# Patient Record
Sex: FEMALE | Race: BLACK OR AFRICAN AMERICAN | NOT HISPANIC OR LATINO | Employment: FULL TIME | URBAN - METROPOLITAN AREA
[De-identification: names, ages, dates, MRNs, and addresses within clinical notes are randomized per-mention and may not be internally consistent; named-entity substitution may affect disease eponyms.]

---

## 2021-03-09 ENCOUNTER — HOSPITAL ENCOUNTER (EMERGENCY)
Facility: HOSPITAL | Age: 46
Discharge: HOME | End: 2021-03-09
Attending: EMERGENCY MEDICINE
Payer: COMMERCIAL

## 2021-03-09 VITALS
WEIGHT: 178 LBS | RESPIRATION RATE: 16 BRPM | OXYGEN SATURATION: 97 % | SYSTOLIC BLOOD PRESSURE: 122 MMHG | TEMPERATURE: 99 F | HEART RATE: 76 BPM | DIASTOLIC BLOOD PRESSURE: 85 MMHG | BODY MASS INDEX: 30.39 KG/M2 | HEIGHT: 64 IN

## 2021-03-09 DIAGNOSIS — K13.79 ORAL PAIN: Primary | ICD-10-CM

## 2021-03-09 PROCEDURE — 99284 EMERGENCY DEPT VISIT MOD MDM: CPT

## 2021-03-09 PROCEDURE — 86803 HEPATITIS C AB TEST: CPT | Performed by: EMERGENCY MEDICINE

## 2021-03-09 PROCEDURE — 99284 EMERGENCY DEPT VISIT MOD MDM: CPT | Mod: ,,, | Performed by: NURSE PRACTITIONER

## 2021-03-09 PROCEDURE — 99284 PR EMERGENCY DEPT VISIT,LEVEL IV: ICD-10-PCS | Mod: ,,, | Performed by: NURSE PRACTITIONER

## 2021-03-09 PROCEDURE — 86703 HIV-1/HIV-2 1 RESULT ANTBDY: CPT | Performed by: EMERGENCY MEDICINE

## 2021-03-09 RX ORDER — PENICILLIN V POTASSIUM 500 MG/1
500 TABLET, FILM COATED ORAL EVERY 6 HOURS
Qty: 28 TABLET | Refills: 0 | Status: SHIPPED | OUTPATIENT
Start: 2021-03-09 | End: 2021-03-16

## 2021-03-09 RX ORDER — IBUPROFEN 600 MG/1
600 TABLET ORAL EVERY 6 HOURS PRN
Qty: 20 TABLET | Refills: 0 | Status: SHIPPED | OUTPATIENT
Start: 2021-03-09

## 2021-03-09 RX ORDER — IBUPROFEN 600 MG/1
600 TABLET ORAL EVERY 6 HOURS PRN
Qty: 20 TABLET | Refills: 0 | Status: SHIPPED | OUTPATIENT
Start: 2021-03-09 | End: 2021-03-09 | Stop reason: SDUPTHER

## 2021-03-10 LAB
HCV AB SERPL QL IA: NEGATIVE
HIV 1+2 AB+HIV1 P24 AG SERPL QL IA: NEGATIVE

## 2023-07-26 ENCOUNTER — HOSPITAL ENCOUNTER (EMERGENCY)
Facility: HOSPITAL | Age: 48
Discharge: HOME | End: 2023-07-26
Attending: EMERGENCY MEDICINE
Payer: COMMERCIAL

## 2023-07-26 VITALS
DIASTOLIC BLOOD PRESSURE: 64 MMHG | HEART RATE: 90 BPM | WEIGHT: 176 LBS | OXYGEN SATURATION: 99 % | TEMPERATURE: 98 F | SYSTOLIC BLOOD PRESSURE: 132 MMHG | BODY MASS INDEX: 30.21 KG/M2 | RESPIRATION RATE: 18 BRPM

## 2023-07-26 DIAGNOSIS — L03.90 CELLULITIS, UNSPECIFIED CELLULITIS SITE: ICD-10-CM

## 2023-07-26 DIAGNOSIS — L02.91 ABSCESS: Primary | ICD-10-CM

## 2023-07-26 LAB
B-HCG UR QL: NEGATIVE
CTP QC/QA: YES

## 2023-07-26 PROCEDURE — 10060 I&D ABSCESS SIMPLE/SINGLE: CPT

## 2023-07-26 PROCEDURE — 99283 EMERGENCY DEPT VISIT LOW MDM: CPT | Mod: 25

## 2023-07-26 PROCEDURE — 25000003 PHARM REV CODE 250: Performed by: PHYSICIAN ASSISTANT

## 2023-07-26 PROCEDURE — 81025 URINE PREGNANCY TEST: CPT | Performed by: PHYSICIAN ASSISTANT

## 2023-07-26 RX ORDER — LIDOCAINE HYDROCHLORIDE 10 MG/ML
10 INJECTION INFILTRATION; PERINEURAL
Status: COMPLETED | OUTPATIENT
Start: 2023-07-26 | End: 2023-07-26

## 2023-07-26 RX ORDER — SULFAMETHOXAZOLE AND TRIMETHOPRIM 800; 160 MG/1; MG/1
1 TABLET ORAL 2 TIMES DAILY
Qty: 14 TABLET | Refills: 0 | Status: SHIPPED | OUTPATIENT
Start: 2023-07-26 | End: 2023-08-02

## 2023-07-26 RX ADMIN — LIDOCAINE HYDROCHLORIDE 10 ML: 10 INJECTION, SOLUTION INFILTRATION; PERINEURAL at 09:07

## 2023-07-27 NOTE — ED NOTES
Patient arrived to ED with complaints of abscess draining on R upper thigh since Sunday (3 days). Denies fever. Small area of purulent drainage with redness to surrounding area. Denies any other injuries, pain. A/o x 4 in no acute distress. Ambulatory steady gait.     APPEARANCE: Alert, oriented and in no acute distress.  CARDIAC: Normal rate and rhythm, no murmur heard.   PERIPHERAL VASCULAR: peripheral pulses present. Normal cap refill. No edema. Warm to touch.    RESPIRATORY:Normal rate and effort, breath sounds clear bilaterally throughout chest. Respirations are equal and unlabored no obvious signs of distress.  GASTRO: soft, bowel sounds normal, no tenderness, no abdominal distention.  MUSC: Full ROM. No bony tenderness or soft tissue tenderness. No obvious deformity.  SKIN: Small abscess noted to upper R thigh. Purulent drainage. Redness noted around abscess. Skin is warm and dry, normal skin turgor, mucous membranes moist.  NEURO: 5/5 strength major flexors/extensors bilaterally. Sensory intact to light touch bilaterally. [place] coma scale: eyes open spontaneously-4, oriented & converses-5, obeys commands-6. No neurological abnormalities.   MENTAL STATUS: awake, alert and aware of environment.  EYE: PERRL, both eyes: pupils brisk and reactive to light. Normal size.  ENT: EARS: no obvious drainage. NOSE: no active bleeding.

## 2023-07-27 NOTE — DISCHARGE INSTRUCTIONS

## 2023-07-27 NOTE — ED PROVIDER NOTES
"Encounter Date: 2023       History     Chief Complaint   Patient presents with    Abscess     Abscess noted to right upper thigh. States bump was present on . States it did have a white head that she popped. States she thinks [place] had [place]      47-year-old female presents to ED with concern of possible insect bite to right proximal thigh that began roughly 4 days ago.  She does admit to attempting to "pop" wound with small amount of purulent drainage.  Gradual worsening swelling today prompting her to report to ED.  Denying fevers, chills, body aches.  No other acute complaints at this time.    The history is provided by the patient.   Review of patient's allergies indicates:  No Known Allergies  Past Medical History:   Diagnosis Date    Eye injury     fb removal os    Ulcer     10 years ago      Past Surgical History:   Procedure Laterality Date     SECTION          TUBAL LIGATION  10/2013    Dr De La O     Family History   Problem Relation Age of Onset    Diabetes Mother     Alcohol abuse Father     Cancer Maternal Aunt         Breast Cancer    Diabetes Paternal Grandmother     Strabismus Paternal Grandmother     Strabismus Paternal Aunt     Amblyopia Neg Hx     Blindness Neg Hx     Cataracts Neg Hx     Glaucoma Neg Hx     Hypertension Neg Hx     Macular degeneration Neg Hx     Retinal detachment Neg Hx     Stroke Neg Hx     Thyroid disease Neg Hx      Social History     Tobacco Use    Smoking status: Never    Smokeless tobacco: Never   Substance Use Topics    Alcohol use: No     Comment: occasionally before pregnancy    Drug use: No     Comment: history of marijuana as teenager on daily basis     Review of Systems   Constitutional:  Negative for chills and fever.   Gastrointestinal:  Negative for nausea and vomiting.   Musculoskeletal:  Negative for myalgias.   Skin:  Positive for color change and wound.     Physical Exam     Initial " Vitals [07/26/23 2036]   BP Pulse Resp Temp SpO2   132/64 90 18 98.4 °F (36.9 °C) 99 %      MAP       --         Physical Exam    Nursing note and vitals reviewed.  Constitutional: Vital signs are normal. She appears well-developed and well-nourished. She is cooperative. She does not have a sickly appearance. She does not appear ill. No distress.   HENT:   Head: Normocephalic and atraumatic.   Eyes: EOM are normal.   Neck:   Normal range of motion.  Musculoskeletal:      Cervical back: Normal range of motion.     Neurological: She is alert and oriented to person, place, and time. GCS eye subscore is 4. GCS verbal subscore is 5. GCS motor subscore is 6.   Skin:        Abscess formation to right anterior proximal thigh with surrounding induration and erythema.  Mild warmth.  Mild blood-tinged/purulent drainage.   Psychiatric: She has a normal mood and affect. Her speech is normal and behavior is normal.       ED Course   I & D - Incision and Drainage    Date/Time: 7/26/2023 10:04 PM  Location procedure was performed: [facility] EMERGENCY DEPARTMENT  Performed by: Dick Godfrey PA-C  Authorized by: Nicholas Lares DO   Consent Done: Yes  Consent: Verbal consent obtained.  Consent given by: patient  Patient identity confirmed: name  Type: abscess  Body area: lower extremity  Location details: right leg  Anesthesia: local infiltration    Anesthesia:  Local Anesthetic: lidocaine 1% without epinephrine    Patient sedated: no  Scalpel size: 11  Incision type: single straight  Drainage: bloody and purulent  Drainage amount: scant  Wound treatment: deloculation and wound packed  Packing material: 1/4 in gauze  Patient tolerance: Patient tolerated the procedure well with no immediate complications      Labs Reviewed   POCT URINE PREGNANCY          Imaging Results    None          Medications   LIDOcaine HCL 10 mg/ml (1%) injection 10 mL (10 mLs Infiltration Given 7/26/23 2102)     Medical Decision Making:   Initial  "Assessment:   Patient presents with concern of possible "insect bite" to right proximal thigh that began roughly 4 days ago.  Progressively worsening in pain and swelling, prompting her to report to ED. afebrile with vitals WNL.  Abscess formation noted with induration and surrounding cellulitis.  Differential Diagnosis:   Abscess, cellulitis, insect bite  ED Management:  Bedside I&D was performed abscess.  Packing was placed.  Sterile dressings applied.  Prescription for Bactrim.  Encouraged to keep area clean and dry, monitor wound healing closely with PCP/ED follow-up for wound check and packing removal.  ED return precautions were discussed.  Patient states her understanding and agrees with plan.                        Clinical Impression:   Final diagnoses:  [L02.91] Abscess (Primary)  [L03.90] Cellulitis, unspecified cellulitis site        ED Disposition Condition    Discharge Stable          ED Prescriptions       Medication Sig Dispense Start Date End Date Auth. Provider    sulfamethoxazole-trimethoprim 800-160mg (BACTRIM DS) 800-160 mg Tab Take 1 tablet by mouth 2 (two) times daily. for 7 days 14 tablet 7/26/2023 8/2/2023 Dick Godfrey PA-C          Follow-up Information       Follow up With Specialties Details Why Contact Info Additional Information    [facility]   [address], [address]  [place] [facility] [place] [facility] [address] [phone] Please park in [address] C or D and use [employer]. Take Medical Office Bldg. elevators.             Dick Godfrey PA-C  07/26/23 0405    "

## 2023-07-28 ENCOUNTER — HOSPITAL ENCOUNTER (EMERGENCY)
Facility: HOSPITAL | Age: 48
Discharge: HOME | End: 2023-07-28
Attending: STUDENT IN AN ORGANIZED HEALTH CARE EDUCATION/TRAINING PROGRAM
Payer: COMMERCIAL

## 2023-07-28 VITALS
BODY MASS INDEX: 30.05 KG/M2 | WEIGHT: 176 LBS | HEIGHT: 64 IN | HEART RATE: 78 BPM | OXYGEN SATURATION: 99 % | TEMPERATURE: 99 F | SYSTOLIC BLOOD PRESSURE: 136 MMHG | RESPIRATION RATE: 16 BRPM | DIASTOLIC BLOOD PRESSURE: 70 MMHG

## 2023-07-28 DIAGNOSIS — Z51.89 WOUND CHECK, ABSCESS: Primary | ICD-10-CM

## 2023-07-28 PROCEDURE — 99281 EMR DPT VST MAYX REQ PHY/QHP: CPT | Mod: ER

## 2023-07-28 NOTE — ED PROVIDER NOTES
Encounter Date: 2023       History     Chief Complaint   Patient presents with    Wound Check     Follow up, abscess to to upper right leg      47-year-old female who presents with a wound check.  She had an abscess to the right upper thigh that underwent incision and drainage a few days ago and she presents for packing removal.  She is currently on antibiotics, Bactrim.  No complications.  She says there leg starting to feel better.    Review of patient's allergies indicates:  No Known Allergies  Past Medical History:   Diagnosis Date    Eye injury     fb removal os    Ulcer     10 years ago      Past Surgical History:   Procedure Laterality Date     SECTION          TUBAL LIGATION  10/2013    Dr De La O     Family History   Problem Relation Age of Onset    Diabetes Mother     Alcohol abuse Father     Cancer Maternal Aunt         Breast Cancer    Diabetes Paternal Grandmother     Strabismus Paternal Grandmother     Strabismus Paternal Aunt     Amblyopia Neg Hx     Blindness Neg Hx     Cataracts Neg Hx     Glaucoma Neg Hx     Hypertension Neg Hx     Macular degeneration Neg Hx     Retinal detachment Neg Hx     Stroke Neg Hx     Thyroid disease Neg Hx      Social History     Tobacco Use    Smoking status: Never    Smokeless tobacco: Never   Substance Use Topics    Alcohol use: No     Comment: occasionally before pregnancy    Drug use: No     Comment: history of marijuana as teenager on daily basis     Review of Systems   All other systems reviewed and are negative.    Physical Exam     Initial Vitals [23 1347]   BP Pulse Resp Temp SpO2   136/70 78 16 98.5 °F (36.9 °C) 99 %      MAP       --         Physical Exam    Nursing note and vitals reviewed.  Constitutional: She appears well-developed and well-nourished.   HENT:   Head: Normocephalic and atraumatic.   Eyes: EOM are normal. Pupils are equal, round, and reactive to light.   Neck: Neck supple. No crepitus.   Normal  range of motion.  Cardiovascular:  Normal rate, regular rhythm, normal heart sounds and intact distal pulses.     Exam reveals no S3.       No murmur heard.  Pulmonary/Chest: Breath sounds normal. No respiratory distress.   Abdominal: Abdomen is soft. Bowel sounds are normal. She exhibits no pulsatile midline mass.   Musculoskeletal:         General: No tenderness or edema. Normal range of motion.      Cervical back: Normal, normal range of motion and neck supple. No deformity, tenderness, bony tenderness or crepitus.      Thoracic back: Normal. No deformity, tenderness or bony tenderness.      Lumbar back: Normal. No deformity, tenderness or bony tenderness. Negative right straight leg raise test and negative left straight leg raise test.     Neurological: She is alert and oriented to person, place, and time. She has normal strength and normal reflexes. She displays normal reflexes. GCS score is 15. GCS eye subscore is 4. GCS verbal subscore is 5. GCS motor subscore is 6.   Skin: Skin is warm and dry. Capillary refill takes less than 2 seconds.   1 cm linear defect in the proximal right upper thigh.  Packing in place, this is removed.  Mild serosanguineous fluid on the packing removed.  No bleeding.  No purulence.  A large Band-Aid was placed over the wound.   Psychiatric: She has a normal mood and affect. Thought content normal.       ED Course   Procedures  Labs Reviewed - No data to display       Imaging Results    None          Medications - No data to display  Medical Decision Making:   Initial Assessment:   As above.  Wound is healing.  Patient was taking Bactrim which is an appropriate antibiotic for her.  We discussed wound care and keep monitoring of her for the next few days to week.  She was instructed to return to her primary care physician within the next few days for a follow-up as needed, and to return to the emergency department for any new or worsening.                        Clinical Impression:    Final diagnoses:  [Z51.89] Wound check, abscess (Primary)        ED Disposition Condition    Discharge Stable          ED Prescriptions    None       Follow-up Information       Follow up With Specialties Details Why Contact Info    Eusebio Rutledge MD Family Medicine Go to  As needed [address]  [place] [address]  [phone]               Albert Shetty MD  07/28/23 1400

## 2023-07-28 NOTE — DISCHARGE INSTRUCTIONS

## 2023-07-28 NOTE — ED NOTES
Packing removed from upper right thigh abcess at this time by MD; wound redressed and patient instructed to continue PO antibiotics.

## 2024-01-03 ENCOUNTER — HOSPITAL ENCOUNTER (EMERGENCY)
Facility: HOSPITAL | Age: 49
Discharge: HOME | End: 2024-01-03
Attending: STUDENT IN AN ORGANIZED HEALTH CARE EDUCATION/TRAINING PROGRAM
Payer: COMMERCIAL

## 2024-01-03 VITALS
HEIGHT: 64 IN | BODY MASS INDEX: 29.53 KG/M2 | RESPIRATION RATE: 18 BRPM | DIASTOLIC BLOOD PRESSURE: 79 MMHG | SYSTOLIC BLOOD PRESSURE: 156 MMHG | OXYGEN SATURATION: 100 % | WEIGHT: 173 LBS | TEMPERATURE: 96 F | HEART RATE: 81 BPM

## 2024-01-03 DIAGNOSIS — L03.115 CELLULITIS OF RIGHT LOWER EXTREMITY: Primary | ICD-10-CM

## 2024-01-03 PROCEDURE — 25000003 PHARM REV CODE 250: Mod: ER | Performed by: STUDENT IN AN ORGANIZED HEALTH CARE EDUCATION/TRAINING PROGRAM

## 2024-01-03 PROCEDURE — 99284 EMERGENCY DEPT VISIT MOD MDM: CPT | Mod: ER

## 2024-01-03 RX ORDER — MUPIROCIN 20 MG/G
1 OINTMENT TOPICAL
Status: COMPLETED | OUTPATIENT
Start: 2024-01-03 | End: 2024-01-03

## 2024-01-03 RX ORDER — SULFAMETHOXAZOLE AND TRIMETHOPRIM 800; 160 MG/1; MG/1
1 TABLET ORAL 2 TIMES DAILY
Qty: 14 TABLET | Refills: 0 | Status: SHIPPED | OUTPATIENT
Start: 2024-01-03 | End: 2024-01-10

## 2024-01-03 RX ORDER — SULFAMETHOXAZOLE AND TRIMETHOPRIM 800; 160 MG/1; MG/1
1 TABLET ORAL
Status: COMPLETED | OUTPATIENT
Start: 2024-01-03 | End: 2024-01-03

## 2024-01-03 RX ADMIN — SULFAMETHOXAZOLE AND TRIMETHOPRIM 1 TABLET: 800; 160 TABLET ORAL at 08:01

## 2024-01-03 RX ADMIN — MUPIROCIN 1 TUBE: 20 OINTMENT TOPICAL at 08:01

## 2024-01-04 NOTE — ED PROVIDER NOTES
NAME:  Daniel Sears  CSN:     [id number]  MRN:    [id number]  ADMIT DATE: 1/3/2024        eMERGENCY dEPARTMENT eNCOUnter    CHIEF COMPLAINT    Chief Complaint   Patient presents with    Abscess     Seen at  for abscess to right lower leg on , discovered today when following up at  she was not given all antibiotics by pharmacy, sent her for further workup        HPI    Daniel Sears is a 48 y.o. female with a past medical history of  has a past medical history of Eye injury and Ulcer.     she presents to the ED due to referral from urgent Care.  States she was seen there on .  They have given her prescriptions for clindamycin, Diflucan and pain medicine but she realizes she never got the prescription of the clindamycin from the pharmacy.  She went back today to get rechecked and they felt that she should come here for additional assessment.  She has been using Vashe regularly to cleanse the area.  States that it started as an infected hair follicle, became larger.  Subsequently drained pus on its own.  No fevers or chills.  Notes decreased appetite.  No vomiting.  No other symptoms currently.      HPI       PAST MEDICAL HISTORY  Past Medical History:   Diagnosis Date    Eye injury     fb removal os    Ulcer     10 years ago        SURGICAL HISTORY    Past Surgical History:   Procedure Laterality Date     SECTION          TUBAL LIGATION  10/2013    Dr De La O       FAMILY HISTORY    Family History   Problem Relation Age of Onset    Diabetes Mother     Alcohol abuse Father     Cancer Maternal Aunt         Breast Cancer    Diabetes Paternal Grandmother     Strabismus Paternal Grandmother     Strabismus Paternal Aunt     Amblyopia Neg Hx     Blindness Neg Hx     Cataracts Neg Hx     Glaucoma Neg Hx     Hypertension Neg Hx     Macular degeneration Neg Hx     Retinal detachment Neg Hx     Stroke Neg Hx     Thyroid disease Neg Hx        SOCIAL HISTORY    Social History     Socioeconomic  "History    Marital status:    Tobacco Use    Smoking status: Never    Smokeless tobacco: Never   Substance and Sexual Activity    Alcohol use: No     Comment: occasionally before pregnancy    Drug use: No     Comment: history of marijuana as teenager on daily basis    Sexual activity: Yes     Partners: Male     Birth control/protection: None       MEDICATIONS  Current Outpatient Medications   Medication Instructions    ibuprofen (ADVIL,MOTRIN) 600 mg, Oral, Every 6 hours PRN    sulfamethoxazole-trimethoprim 800-160mg (BACTRIM DS) 800-160 mg Tab 1 tablet, Oral, 2 times daily       ALLERGIES    Review of patient's allergies indicates:  No Known Allergies      REVIEW OF SYSTEMS   Review of Systems       PHYSICAL EXAM    Reviewed Triage Note    VITAL SIGNS:   ED Triage Vitals [01/03/24 1941]   Enc Vitals Group      BP (!) 156/79      Pulse 81      Resp 18      Temp 96.3 °F (35.7 °C)      Temp src       SpO2 100 %      Weight 173 lb      Height 5' 4"      Head Circumference       Peak Flow       Pain Score       Pain Loc       Pain Edu?       Excl. in GC?        Patient Vitals for the past 24 hrs:   BP Temp Pulse Resp SpO2 Height Weight   01/03/24 1941 (!) 156/79 96.3 °F (35.7 °C) 81 18 100 % 5' 4" (1.626 m) 78.5 kg (173 lb)       Physical Exam    Nursing note and vitals reviewed.  Constitutional: She appears well-developed and well-nourished.   HENT:   Head: Normocephalic and atraumatic.   Eyes: EOM are normal. Pupils are equal, round, and reactive to light.   Neck: Neck supple.   Normal range of motion.  Cardiovascular:  Normal rate.           Pulmonary/Chest: No respiratory distress.   Musculoskeletal:         General: Normal range of motion.      Cervical back: Normal range of motion and neck supple.      Comments: Lesion to right lateral mid lower leg as pictured below.  No fluctuance or induration.  No overlying warmth.  No drainage appreciated     Neurological: She is alert and oriented to person, place, " and time.   Skin: Skin is warm and dry.   Psychiatric: She has a normal mood and affect.                      EKG     Interpreted by EM physician if performed:               LABS  Pertinent labs reviewed. (See chart for details)   Labs Reviewed - No data to display      RADIOLOGY          Imaging Results    None           PROCEDURES    Procedures      ED COURSE & MEDICAL DECISION MAKING    Pertinent Labs & Imaging studies reviewed. (See chart for details and specific orders.)          Summary of review of records:   Reviewed urgent care records from last visit.  Does appear that they prior did all 3 prescriptions at that time including clindamycin, however, patient states the pharmacy did not fill this for her and she did not realize this    Medical Decision Making  Risk  Prescription drug management.      Daniel Sears is a 48 y.o. female who presents for evaluation of skin infection.  Was never treated with antibiotics.    Differential includes but is not limited to healing cellulitic infection.  Doubt sepsis or underlying abscess at this time.  Plan to treat with antibiotics.  Strict return precautions provided and all questions addressed              Medications   sulfamethoxazole-trimethoprim 800-160mg per tablet 1 tablet (1 tablet Oral Given 1/3/24 2020)   mupirocin 2 % ointment 1 Tube (1 Tube Topical (Top) Given 1/3/24 2021)                  FINAL IMPRESSION    Final diagnoses:  [L03.115] Cellulitis of right lower extremity (Primary)       DISPOSITION  Patient discharge in stable condition        ED Prescriptions       Medication Sig Dispense Start Date End Date Auth. Provider    sulfamethoxazole-trimethoprim 800-160mg (BACTRIM DS) 800-160 mg Tab Take 1 tablet by mouth 2 (two) times daily. for 7 days 14 tablet 1/3/2024 1/10/2024 Johanna Friend, DO          Follow-up Information       Follow up With Specialties Details Why Contact Info    Eusebio Rutledge MD Family Medicine Schedule an appointment as  soon as possible for a visit   [address]  [place] [address]  [phone]      [facility] - Emergency Dept Emergency Medicine  As needed, If symptoms worsen [address] [address] [phone]              DISCLAIMER: This note was prepared with [employer] voice recognition transcription software. Garbled syntax, mangled pronouns, and other bizarre constructions may be attributed to that software system.             Johanna Friend,   01/04/24 0250       Johanna Friend,   01/04/24 0251